# Patient Record
Sex: FEMALE | Race: BLACK OR AFRICAN AMERICAN | NOT HISPANIC OR LATINO | Employment: UNEMPLOYED | URBAN - METROPOLITAN AREA
[De-identification: names, ages, dates, MRNs, and addresses within clinical notes are randomized per-mention and may not be internally consistent; named-entity substitution may affect disease eponyms.]

---

## 2024-09-24 ENCOUNTER — HOSPITAL ENCOUNTER (EMERGENCY)
Facility: HOSPITAL | Age: 19
Discharge: HOME | End: 2024-09-24
Attending: EMERGENCY MEDICINE | Admitting: EMERGENCY MEDICINE
Payer: COMMERCIAL

## 2024-09-24 VITALS
DIASTOLIC BLOOD PRESSURE: 64 MMHG | SYSTOLIC BLOOD PRESSURE: 115 MMHG | OXYGEN SATURATION: 100 % | HEART RATE: 87 BPM | TEMPERATURE: 98.9 F | RESPIRATION RATE: 20 BRPM

## 2024-09-24 DIAGNOSIS — U07.1 COVID-19: Primary | ICD-10-CM

## 2024-09-24 LAB
BACTERIA UR QL AUTO: ABNORMAL /HPF
BILIRUB UR QL STRIP: NEGATIVE
CLARITY UR: CLEAR
COLOR UR: YELLOW
FLUAV AG UPPER RESP QL IA.RAPID: NEGATIVE
FLUBV AG UPPER RESP QL IA.RAPID: NEGATIVE
GLUCOSE UR STRIP-MCNC: NEGATIVE MG/DL
HGB UR QL STRIP.AUTO: NEGATIVE
KETONES UR STRIP-MCNC: NEGATIVE MG/DL
LEUKOCYTE ESTERASE UR QL STRIP: NEGATIVE
MUCOUS THREADS UR QL AUTO: ABNORMAL
NITRITE UR QL STRIP: NEGATIVE
NON-SQ EPI CELLS URNS QL MICRO: ABNORMAL /HPF
PH UR STRIP.AUTO: 6.5 [PH]
PROT UR STRIP-MCNC: ABNORMAL MG/DL
RBC #/AREA URNS AUTO: ABNORMAL /HPF
SARS-COV+SARS-COV-2 AG RESP QL IA.RAPID: POSITIVE
SP GR UR STRIP.AUTO: 1.02 (ref 1–1.03)
UROBILINOGEN UR STRIP-ACNC: <2 MG/DL
WBC #/AREA URNS AUTO: ABNORMAL /HPF

## 2024-09-24 PROCEDURE — 99283 EMERGENCY DEPT VISIT LOW MDM: CPT

## 2024-09-24 PROCEDURE — 81001 URINALYSIS AUTO W/SCOPE: CPT | Performed by: EMERGENCY MEDICINE

## 2024-09-24 PROCEDURE — 87811 SARS-COV-2 COVID19 W/OPTIC: CPT | Performed by: EMERGENCY MEDICINE

## 2024-09-24 PROCEDURE — 87086 URINE CULTURE/COLONY COUNT: CPT | Performed by: EMERGENCY MEDICINE

## 2024-09-24 PROCEDURE — 87804 INFLUENZA ASSAY W/OPTIC: CPT | Performed by: EMERGENCY MEDICINE

## 2024-09-24 PROCEDURE — 99284 EMERGENCY DEPT VISIT MOD MDM: CPT | Performed by: EMERGENCY MEDICINE

## 2024-09-24 RX ORDER — ONDANSETRON 4 MG/1
4 TABLET, ORALLY DISINTEGRATING ORAL ONCE
Status: COMPLETED | OUTPATIENT
Start: 2024-09-24 | End: 2024-09-24

## 2024-09-24 RX ORDER — ONDANSETRON 4 MG/1
4 TABLET, ORALLY DISINTEGRATING ORAL EVERY 6 HOURS PRN
Qty: 15 TABLET | Refills: 0 | Status: SHIPPED | OUTPATIENT
Start: 2024-09-24

## 2024-09-24 RX ADMIN — ONDANSETRON 4 MG: 4 TABLET, ORALLY DISINTEGRATING ORAL at 22:15

## 2024-09-25 NOTE — DISCHARGE INSTRUCTIONS
You have COVID.  Please take Tylenol every 6 hours as needed for fever and pain.  I have also sent you a prescription for Zofran.  You can take 1 tablet every 6 hours.  Please follow-up with your primary care doctor.  If you do not have 1 you can call [facility].

## 2024-09-26 LAB — BACTERIA UR CULT: NORMAL

## 2025-05-21 ENCOUNTER — HOSPITAL ENCOUNTER (EMERGENCY)
Facility: HOSPITAL | Age: 20
End: 2025-05-21
Attending: EMERGENCY MEDICINE | Admitting: EMERGENCY MEDICINE
Payer: COMMERCIAL

## 2025-05-21 ENCOUNTER — HOSPITAL ENCOUNTER (OUTPATIENT)
Facility: HOSPITAL | Age: 20
Discharge: HOME | End: 2025-05-21
Attending: OBSTETRICS & GYNECOLOGY | Admitting: OBSTETRICS & GYNECOLOGY
Payer: COMMERCIAL

## 2025-05-21 VITALS
TEMPERATURE: 98 F | RESPIRATION RATE: 18 BRPM | DIASTOLIC BLOOD PRESSURE: 65 MMHG | HEART RATE: 84 BPM | SYSTOLIC BLOOD PRESSURE: 105 MMHG

## 2025-05-21 VITALS
RESPIRATION RATE: 18 BRPM | OXYGEN SATURATION: 96 % | HEART RATE: 94 BPM | WEIGHT: 203 LBS | DIASTOLIC BLOOD PRESSURE: 55 MMHG | TEMPERATURE: 98.7 F | SYSTOLIC BLOOD PRESSURE: 113 MMHG

## 2025-05-21 DIAGNOSIS — O47.9 UTERINE CONTRACTIONS DURING PREGNANCY: Primary | ICD-10-CM

## 2025-05-21 PROBLEM — R10.9 ABDOMINAL CRAMPING COMPLICATING PREGNANCY, ANTEPARTUM: Status: ACTIVE | Noted: 2025-05-21

## 2025-05-21 PROBLEM — O26.899 ABDOMINAL CRAMPING COMPLICATING PREGNANCY, ANTEPARTUM: Status: ACTIVE | Noted: 2025-05-21

## 2025-05-21 PROBLEM — Z3A.39 39 WEEKS GESTATION OF PREGNANCY: Status: ACTIVE | Noted: 2025-05-20

## 2025-05-21 PROCEDURE — 99284 EMERGENCY DEPT VISIT MOD MDM: CPT

## 2025-05-21 PROCEDURE — NC001 PR NO CHARGE: Performed by: PHYSICIAN ASSISTANT

## 2025-05-21 PROCEDURE — 99214 OFFICE O/P EST MOD 30 MIN: CPT

## 2025-05-21 PROCEDURE — 99285 EMERGENCY DEPT VISIT HI MDM: CPT | Performed by: EMERGENCY MEDICINE

## 2025-05-21 NOTE — PROGRESS NOTES
L&D Triage Note - OB/GYN  Adeline Jackman 19 y.o. female MRN: [id number]  Unit/Bed#: LD TRIAGE  Encounter: [id number]      ASSESSMENT/PLAN  Adeline Jackman is a 19 y.o.  at 39w6d who presents with contractions.       * Abdominal cramping complicating pregnancy, antepartum  Assessment & Plan    Possibly in early labor, although cx with minimal change from exam yesterday.   NST reactive and reassuring. FERCHO normal at 13.9cm.   Will plan d/c so she can deliver with her regular care team as pt desires.   Aware can return if needed due to geographic proximity to her home in [place].           Discharge instructions  - Patient instructed to call if experiencing worsening contractions, vaginal bleeding, loss of fluid or decreased fetal movement.  - Will follow up with OBGYN in office  -Pt has IOL planned for 25 with primary OB team in [place]      She is a patient of [facility]  D/w Dr. Jaffe, on call OBGYN Attending Physician  ______________    SUBJECTIVE    FACUNDO: Estimated Date of Delivery: None noted.    HPI:  19 y.o.  Unknown presents with complaint of contractions since 0330 today. She notes she woke up and has been having steady ctxns since this am. Denies VB/LOF. Good FM. No fevers/chills. Notes pain 7 out of 10.       Contractions: q3-4 minutes  Leakage of fluid: denies   Vaginal Bleeding: denies   Fetal movement: present    Her obstetrical history is significant for care in [place].     ROS:  Constitutional: Negative  Respiratory: Negative  Cardiovascular: Negative    Gastrointestinal: Negative    Physical Exam  General: Well appearing, no distress  Respiratory: Unlabored breathing  Cardiovascular: Regular rate  Abdomen: Soft, gravid, nontender   or gestational age.  Extremities: Warm and well perfused.  Non tender.      OBJECTIVE:  /65 (BP Location: Left arm)   Pulse 84   Temp 98 °F (36.7 °C) (Oral)   Resp 18   LMP 08/15/2024 (Exact Date)   There is no height or  "weight on file to calculate BMI.  Labs: No results found for this or any previous visit (from the past 24 hours).      SVE: 3/80/-2       FHT:  Baseline Rate (FHR): 145 bpm  Variability: Moderate  Accelerations: 15 x 15 or greater, With fetal movement, At variable times  Decelerations: None    TOCO:   Contraction Frequency (minutes): 2-5  Contraction Duration (seconds): 50-70  Contraction Intensity: Mild/Moderate    IMAGING:           TAUS   FERCHO      - Q1 4.1cm     - Q2 2.33cm     - Q3 4.82 cm     - Q4 2.7cm     - Total: 13.9cm     Presentation: ari Clifton PA-C  OB/GYN   5/21/2025  1:39 PM      Portions of the record may have been created with voice recognition software.  Occasional wrong word or \"sound a like\" substitutions may have occurred due to the inherent limitations of voice recognition software.  Read the chart carefully and recognize, using context, where substitutions have occurred    "

## 2025-05-21 NOTE — ASSESSMENT & PLAN NOTE
Possibly in early labor, although cx with minimal change from exam yesterday.   NST reactive and reassuring. FERCHO normal at 13.9cm.   Will plan d/c so she can deliver with her regular care team as pt desires.   Aware can return if needed due to geographic proximity to her home in [place].

## 2025-05-21 NOTE — PROCEDURES
Adeline Jackman, a  at 39w6d with an FACUNDO of 2025, by Last Menstrual Period, was seen at [facility] LABOR AND DELIVERY for the following procedure(s):  ]    FERCHO  Q1: 4.14cm  Q2:2.33 cm  Q3:4.82cm  Q4:2.71 cm  Total :13.9cm  Vtx presentation

## 2025-05-21 NOTE — ED PROVIDER NOTES
Time reflects when diagnosis was documented in both MDM as applicable and the Disposition within this note       Time User Action Codes Description Comment    5/21/2025 11:18 AM Eryn Ramirez Add [O46.90,  O47.9] Vaginal bleeding with contractions and pain in pregnant patient after first trimester     5/21/2025 11:18 AM Eryn Ramirez Remove [O46.90,  O47.9] Vaginal bleeding with contractions and pain in pregnant patient after first trimester     5/21/2025 11:18 AM Eryn Ramirez Add [O47.9] Uterine contractions during pregnancy           ED Disposition       ED Disposition   Transfer to Another Facility-In Network    Condition   --    Date/Time   Wed May 21, 2025 11:22 AM    Comment   Adeline Jackman should be transferred out to [facility].               Assessment & Plan       Medical Decision Making  Pt is a 20yo F who presents with abdominal pain and pregnancy.     Based on timing and description, likely early labor.  Will plan for transfer to OB capable facility.  Fetal heart tones 145.     Plan to transfer to [facility].            ED Course as of 05/21/25 1323   Wed May 21, 2025   1114 Awaiting ObGyn response to facilitate transfer.        Medications - No data to display    ED Risk Strat Scores                    No data recorded        SBIRT 22yo+      Flowsheet Row Most Recent Value   Initial Alcohol Screen: US AUDIT-C     1. How often do you have a drink containing alcohol? 0 Filed at: 05/21/2025 1103   2. How many drinks containing alcohol do you have on a typical day you are drinking?  0 Filed at: 05/21/2025 1103   3a. Male UNDER 65: How often do you have five or more drinks on one occasion? 0 Filed at: 05/21/2025 1103   3b. FEMALE Any Age, or MALE 65+: How often do you have 4 or more drinks on one occassion? 0 Filed at: 05/21/2025 1103   Audit-C Score 0 Filed at: 05/21/2025 1103                            History of Present Illness       Chief Complaint   Patient presents with    Abdominal Pain  Pregnant     40 weeks pregnant and having contractions. C/o lower abdominal pain today       History reviewed. No pertinent past medical history.   History reviewed. No pertinent surgical history.   History reviewed. No pertinent family history.   Social History[1]   E-Cigarette/Vaping    E-Cigarette Use Never User       E-Cigarette/Vaping Substances    Nicotine No     THC No     CBD No     Flavoring No     Other No     Unknown No       I have reviewed and agree with the history as documented.     Pt is a 20yo F who presents for abdominal pain in pregnancy.  Patient reports she is approximately 40 weeks pregnant.  Patient reports she has had prenatal care and has had no known complications of the pregnancy.  Patient reports that approximately 3 AM, she began with intermittent abdominal cramping.  Patient reports that she has tried to sleep through it but it has become more frequent.  Patient reports currently approximately 5 minutes apart.  Patient denies any vaginal bleeding or leakage of fluid.  Patient reports that prior to that she was feeling well.  Patient is a .  Patient reports 2 previous abortions.          Objective       ED Triage Vitals   Temperature Pulse Blood Pressure Respirations SpO2 Patient Position - Orthostatic VS   25 1058 25 1058 25 1058 25 1058 25 1058 25 1107   98.7 °F (37.1 °C) 91 128/69 20 99 % Lying      Temp src Heart Rate Source BP Location FiO2 (%) Pain Score    -- 25 1107 25 1107 -- 25 1058     Monitor Left arm  7      Vitals      Date and Time Temp Pulse SpO2 Resp BP Pain Score FACES Pain Rating User   25 1200 -- -- -- 18 -- -- -- CS   25 1200 -- 94 96 % -- 113/55 -- -- DQ   25 1145 -- 88 98 % 18 124/70 -- -- CS   25 1138 -- 87 98 % 17 105/58 -- -- DQ   25 1130 -- 93 98 % 20 116/55 -- -- CS   25 1115 -- 95 97 % 18 118/67 -- -- CS   25 1107 -- 96 99 % 18 116/66 -- -- CS   25 1106  -- -- -- -- -- 7 --    05/21/25 1058 98.7 °F (37.1 °C) 91 99 % 20 128/69 7 -- RON            Physical Exam  Vitals reviewed.   Constitutional:       General: She is not in acute distress.     Appearance: She is well-developed. She is not toxic-appearing or diaphoretic.   HENT:      Head: Normocephalic and atraumatic.      Right Ear: External ear normal.      Left Ear: External ear normal.      Nose: Nose normal.      Mouth/Throat:      Pharynx: Oropharynx is clear.     Eyes:      Extraocular Movements: Extraocular movements intact.      Conjunctiva/sclera: Conjunctivae normal.      Pupils: Pupils are equal, round, and reactive to light.       Cardiovascular:      Rate and Rhythm: Normal rate and regular rhythm.      Heart sounds: Normal heart sounds.   Pulmonary:      Effort: Pulmonary effort is normal.      Breath sounds: Normal breath sounds.   Abdominal:      General: Bowel sounds are normal. There is no distension.      Palpations: Abdomen is soft.      Tenderness: There is no abdominal tenderness.      Comments: Gravid     Musculoskeletal:         General: Normal range of motion.      Cervical back: Normal range of motion and neck supple.     Skin:     General: Skin is warm and dry.      Capillary Refill: Capillary refill takes less than 2 seconds.     Neurological:      General: No focal deficit present.      Mental Status: She is alert and oriented to person, place, and time.     Psychiatric:         Speech: Speech normal.         Behavior: Behavior is cooperative.         Results Reviewed       None            No orders to display       Procedures    ED Medication and Procedure Management   Prior to Admission Medications   Prescriptions Last Dose Informant Patient Reported? Taking?   ondansetron (ZOFRAN-ODT) 4 mg disintegrating tablet   No No   Sig: Take 1 tablet (4 mg total) by mouth every 6 (six) hours as needed for nausea or vomiting      Facility-Administered Medications: None     Discharge Medication  List as of 5/21/2025 12:02 PM        CONTINUE these medications which have NOT CHANGED    Details   ondansetron (ZOFRAN-ODT) 4 mg disintegrating tablet Take 1 tablet (4 mg total) by mouth every 6 (six) hours as needed for nausea or vomiting, Starting Tue 9/24/2024, Normal           No discharge procedures on file.  ED SEPSIS DOCUMENTATION   Time reflects when diagnosis was documented in both MDM as applicable and the Disposition within this note       Time User Action Codes Description Comment    5/21/2025 11:18 AM Eryn Ramirez [O46.90,  O47.9] Vaginal bleeding with contractions and pain in pregnant patient after first trimester     5/21/2025 11:18 AM Eryn Ramirez Remove [O46.90,  O47.9] Vaginal bleeding with contractions and pain in pregnant patient after first trimester     5/21/2025 11:18 AM Eryn Ramirez [O47.9] Uterine contractions during pregnancy                    [1]   Social History  Tobacco Use    Smoking status: Never    Smokeless tobacco: Never   Vaping Use    Vaping status: Never Used   Substance Use Topics    Alcohol use: Never    Drug use: Never        Eryn Ramirez MD  05/21/25 1755

## 2025-05-21 NOTE — EMTALA/ACUTE CARE TRANSFER
[facility] EMERGENCY DEPARTMENT  [address] [address]  Dept: [phone]      EMTALA TRANSFER CONSENT    NAME Adeline Jackman                                         2005                              MRN [id number]    I have been informed of my rights regarding examination, treatment, and transfer   by Dr. Eryn Ramirez MD    Benefits: Specialized equipment and/or services available at the receiving facility (Include comment)________________________ (ObGyn)    Risks: Potential for delay in receiving treatment, Potential deterioration of medical condition, Loss of IV, Increased discomfort during transfer, Possible worsening of condition or death during transfer      Consent for Transfer:  I acknowledge that my medical condition has been evaluated and explained to me by the emergency department physician or other qualified medical person and/or my attending physician, who has recommended that I be transferred to the service of  Accepting Physician: Priya at Accepting Facility Name, City & State : [facility]. The above potential benefits of such transfer, the potential risks associated with such transfer, and the probable risks of not being transferred have been explained to me, and I fully understand them.  The doctor has explained that, in my case, the benefits of transfer outweigh the risks.  I agree to be transferred.    I authorize the performance of emergency medical procedures and treatments upon me in both transit and upon arrival at the receiving facility.  Additionally, I authorize the release of any and all medical records to the receiving facility and request they be transported with me, if possible.  I understand that the safest mode of transportation during a medical emergency is an ambulance and that the Hospital advocates the use of this mode of transport. Risks of traveling to the receiving facility by car, including absence of medical control, life  sustaining equipment, such as oxygen, and medical personnel has been explained to me and I fully understand them.    (ARSALAN CORRECT BOX BELOW)  [  ]  I consent to the stated transfer and to be transported by ambulance/helicopter.  [  ]  I consent to the stated transfer, but refuse transportation by ambulance and accept full responsibility for my transportation by car.  I understand the risks of non-ambulance transfers and I exonerate the Hospital and its staff from any deterioration in my condition that results from this refusal.    X___________________________________________    DATE  25  TIME________  Signature of patient or legally responsible individual signing on patient behalf           RELATIONSHIP TO PATIENT_________________________          Provider Certification    NAME Adeline Jackman                                         2005                              MRN [id number]    A medical screening exam was performed on the above named patient.  Based on the examination:    Condition Necessitating Transfer The encounter diagnosis was Uterine contractions during pregnancy.    Patient Condition: Pregnant woman having contractions, The patient has been stabilized such that within reasonable medical probability, no material deterioration of the patient condition or the condition of the unborn child([place]) is likely to result from the transfer    Reason for Transfer: Level of Care needed not available at this facility    Transfer Requirements: Facility [facility]   Space available and qualified personnel available for treatment as acknowledged by    Agreed to accept transfer and to provide appropriate medical treatment as acknowledged by       [place]  Appropriate medical records of the examination and treatment of the patient are provided at the time of transfer   STAFF INITIAL WHEN COMPLETED _______  Transfer will be performed by qualified personnel from    and appropriate transfer equipment as  required, including the use of necessary and appropriate life support measures.    Provider Certification: I have examined the patient and explained the following risks and benefits of being transferred/refusing transfer to the patient/family:  General risk, such as traffic hazards, adverse weather conditions, rough terrain or turbulence, possible failure of equipment (including vehicle or aircraft), or consequences of actions of persons outside the control of the transport personnel, Risk of worsening condition, Unanticipated needs of medical equipment and personnel during transport, The possibility of a transport vehicle being unavailable      Based on these reasonable risks and benefits to the patient and/or the unborn child([place]), and based upon the information available at the time of the patient’s examination, I certify that the medical benefits reasonably to be expected from the provision of appropriate medical treatments at another medical facility outweigh the increasing risks, if any, to the individual’s medical condition, and in the case of labor to the unborn child, from effecting the transfer.    X____________________________________________ DATE 05/21/25        TIME_______      ORIGINAL - SEND TO MEDICAL RECORDS   COPY - SEND WITH PATIENT DURING TRANSFER